# Patient Record
(demographics unavailable — no encounter records)

---

## 2019-02-05 NOTE — EMERGENCY DEPARTMENT REPORT
ED Abdominal Pain HPI





- General


Chief Complaint: Abdominal Pain


Stated Complaint: UPPER STOMACH PAIN


Time Seen by Provider: 19 02:26


Source: patient


Mode of arrival: Ambulatory


Limitations: No Limitations





- History of Present Illness


Initial Comments: 


Patient is a 57 -year-old female who presents for left flank pain radiating from

left upper quadrant for the past 5 months patient followed by GI followed by PCP

advised that she has costochondritis however now pain is exacerbated by voiding 

there is no fever no chills no hematuria patient does endorse frequency urgency 

there is no vaginal discharge or bladder incontinence is been no nausea vomiting

symptoms are relieved by narcotic pain medicines symptoms are exacerbated by 

voiding movement bending twisting. 





MD Complaint: abdominal pain, flank pain


Onset/Timin


-: week(s), unknown (x 5 months recurring )


Location: L flank


Radiation: LUQ


Migration to: no migration, L flank


Severity: moderate


Severity scale (0 -10): 5


Quality: aching, sharp


Consistency: constant


Improves With: nothing


Worsens With: other (voiding bending twisting )


Associated Symptoms: dysuria.  denies: nausea, vomiting, diarrhea, fever, 

chills, constipation, hematemesis, hematochezia, melena, hematuria, anorexia, 

syncope





- Related Data


LMP (females 10-50): other (s/p menopausal)


                                  Previous Rx's











 Medication  Instructions  Recorded  Last Taken  Type


 


Ciprofloxacin HCl [Cipro] 500 mg PO BID #20 tablet 19 Unknown Rx


 


Omeprazole 40 mg PO DAILY #30 capsule. 19 Unknown Rx


 


metroNIDAZOLE [Flagyl] 500 mg PO BID 10 Days #20 tab 19 Unknown Rx


 


traMADol [Ultram] 50 mg PO Q6HR PRN #12 tablet 19 Unknown Rx











                                    Allergies











Allergy/AdvReac Type Severity Reaction Status Date / Time


 


No Known Allergies Allergy   Verified 19 03:37














ED Review of Systems


ROS: 


Stated complaint: UPPER STOMACH PAIN


Other details as noted in HPI





Constitutional: denies: chills, fever


Eyes: denies: eye pain, eye discharge, vision change


ENT: denies: ear pain, throat pain


Respiratory: denies: cough, shortness of breath, wheezing


Cardiovascular: denies: chest pain, palpitations


Endocrine: no symptoms reported


Gastrointestinal: abdominal pain (LUQ ).  denies: nausea, vomiting, diarrhea, 

constipation, hematemesis, melena, hematochezia


Genitourinary: urgency, dysuria, frequency.  denies: hematuria, discharge, 

abnormal menses


Musculoskeletal: denies: back pain, joint swelling, arthralgia


Skin: denies: rash, lesions


Neurological: denies: headache, weakness, paresthesias


Psychiatric: denies: anxiety, depression


Hematological/Lymphatic: denies: easy bleeding, easy bruising





ED Past Medical Hx





- Past Medical History


Previous Medical History?: No





- Surgical History


Additional Surgical History: hysterectomy





- Social History


Smoking Status: Never Smoker


Substance Use Type: None





- Medications


Home Medications: 


                                Home Medications











 Medication  Instructions  Recorded  Confirmed  Last Taken  Type


 


Ciprofloxacin HCl [Cipro] 500 mg PO BID #20 tablet 19  Unknown Rx


 


Omeprazole 40 mg PO DAILY #30 capsule.dr 19  Unknown Rx


 


metroNIDAZOLE [Flagyl] 500 mg PO BID 10 Days #20 tab 19  Unknown Rx


 


traMADol [Ultram] 50 mg PO Q6HR PRN #12 tablet 19  Unknown Rx














ED Physical Exam





- General


Limitations: No Limitations


General appearance: alert, in no apparent distress





- Head


Head exam: Present: atraumatic, normocephalic





- Eye


Eye exam: Present: normal appearance, PERRL, EOMI





- ENT


ENT exam: Present: mucous membranes moist





- Neck


Neck exam: Present: normal inspection, full ROM.  Absent: tenderness, 

lymphadenopathy, thyromegaly





- Respiratory


Respiratory exam: Present: normal lung sounds bilaterally.  Absent: respiratory 

distress, wheezes, stridor, chest wall tenderness





- Cardiovascular


Cardiovascular Exam: Present: regular rate, normal rhythm, normal heart sounds. 

 Absent: systolic murmur, diastolic murmur, rubs, gallop





- GI/Abdominal


GI/Abdominal exam: Present: soft, tenderness (LUQ ), normal bowel sounds.  Abse

nt: distended, guarding, rebound, rigid, organomegaly, bruit, hernia





- Rectal


Rectal exam: Present: deferred





- 


External exam: Present: normal external exam, other (exam deferred )





- Extremities Exam


Extremities exam: Present: normal inspection





- Back Exam


Back exam: Present: normal inspection, full ROM.  Absent: tenderness, CVA 

tenderness (R), CVA tenderness (L), muscle spasm, rash noted





- Neurological Exam


Neurological exam: Present: alert, oriented X3, CN II-XII intact, normal gait





- Psychiatric


Psychiatric exam: Present: normal affect, normal mood





- Skin


Skin exam: Present: warm, dry, intact, normal color.  Absent: rash





ED Course


                                   Vital Signs











  19





  19:49 03:38


 


Temperature 97.9 F 


 


Pulse Rate 101 H 


 


Respiratory 18 20





Rate  


 


Blood Pressure 146/88 


 


O2 Sat by Pulse 99 





Oximetry  














ED Medical Decision Making





- Lab Data


Result diagrams: 


                                 19 20:01





                                 19 20:01








Labs











  19





  20:01 20:01 20:20


 


WBC  9.2  


 


RBC  4.68  


 


Hgb  13.5  


 


Hct  40.7  


 


MCV  87  


 


MCH  29  


 


MCHC  33  


 


RDW  13.4  


 


Plt Count  256  


 


Lymph % (Auto)  31.8  


 


Mono % (Auto)  5.9  


 


Eos % (Auto)  2.9  


 


Baso % (Auto)  0.5  


 


Lymph #  2.9  


 


Mono #  0.5  


 


Eos #  0.3  


 


Baso #  0.0  


 


Seg Neutrophils %  58.9  


 


Seg Neutrophils #  5.4  


 


Sodium   139 


 


Potassium   4.1 


 


Chloride   98.7 


 


Carbon Dioxide   29 


 


Anion Gap   15 


 


BUN   14 


 


Creatinine   0.8 


 


Estimated GFR   > 60 


 


BUN/Creatinine Ratio   18 


 


Glucose   191 H 


 


Calcium   9.3 


 


Total Bilirubin   0.60 


 


AST   11 


 


ALT   12 


 


Alkaline Phosphatase   72 


 


Total Protein   7.4 


 


Albumin   4.0 


 


Albumin/Globulin Ratio   1.2 


 


Lipase   65 H 


 


Urine Color    Straw


 


Urine Turbidity    Clear


 


Urine pH    6.0


 


Ur Specific Gravity    1.025


 


Urine Protein    <15 mg/dl


 


Urine Glucose (UA)    >=500


 


Urine Ketones    Neg


 


Urine Blood    Neg


 


Urine Nitrite    Neg


 


Urine Bilirubin    Neg


 


Urine Urobilinogen    < 2.0


 


Ur Leukocyte Esterase    Lg


 


Urine WBC (Auto)    121.0 H


 


Urine RBC (Auto)    8.0














- Radiology Data


Radiology results: report reviewed, image reviewed


FINAL REPORT 





PROCEDURE: CT ABDOMEN PELVIS WO CON 





TECHNIQUE: Computerized axial tomography of the abdomen and pelvis was performed

 without 


intravenous contrast. This study is performed without intravascular contrast mat

erial and its 


sensitivity for abdominal and pelvic pathology, including neoplasms, 

inflammation, abscess, free 


fluid, thrombosis, arterial dissection and infarction, is reduced compared with 

a contrast enhanced 


study. 





HISTORY: left flank pain 





COMPARISON: No prior studies are available for comparison. 





FINDINGS: 


Visualized lower thorax: No significant abnormality. 





Liver: Normal size and attenuation. 





Spleen: Normal size and attenuation. 





Gallbladder and biliary system: Normal. 





Pancreas: Normal. 





Adrenals: Normal. 





Kidneys: There are multiple cysts in the left kidney measuring up to 4 

centimeters. There are no 


kidney stones or ureteral stones. There is no hydronephrosis.. 





GI tract: There is no bowel obstruction, colitis or enteritis. There are 

diverticula of the sigmoid


and left colon. The appendix is normal.. 





Lymph nodes and mesentery: Normal. 





Vasculature: Normal. 





Bladder: Normal. 





Reproductive organs: There has been a hysterectomy. 





Peritoneum: There is no ascites or free air, abscess or adenopathy.. 





Musculoskeletal structures: No significant abnormality. 





Other: None. 





IMPRESSION: 








There are multiple cysts in the left kidney measuring up to 4 centimeters. There

 are no kidney 


stones or ureteral stones. There is no hydronephrosis.. 





There is no bowel obstruction, colitis or enteritis. There are diverticula of 

the sigmoid and left 


colon. The appendix is normal.. 





There has been a hysterectomy. 





There is no ascites or free air, abscess or adenopathy.. 





. 











Transcribed By: CO 


Dictated By: JEREMIE HORN MD 


Electronically Authenticated By: JEREMIE HORN MD 


Signed Date/Time: 19 











DD/DT: 19 


TD/TT: 19








- Medical Decision Making


 CT abd and pelvis: pos for renal cyst bilat, diverticula in sigmoid and left 

colon no diverticulitis , urine : pos for wbc, leuk, plan: rocepin 1gm ivpb, dc 

to home with cipro and flagyl, omeprazole, ultram prn pain pt will follow up 

with pcp in 2-3 days follow up with GI will call today to confirm apt with 

[facility] as she has been seen by them in past pain is now improved, pt is 

tolerating po intake without n/v no there is no fever or chills, pt for dc to 

home in stable condition at this time. 





Critical care attestation.: 


If time is entered above; I have spent that time in minutes in the direct care 

of this critically ill patient, excluding procedure time.








ED Disposition


Clinical Impression: 


 Renal cyst





UTI (urinary tract infection)


Qualifiers:


 Urinary tract infection type: acute cystitis Hematuria presence: without 

hematuria Qualified Code(s): N30.00 - Acute cystitis without hematuria





Abdominal pain


Qualifiers:


 Abdominal location: left upper quadrant Qualified Code(s): R10.12 - Left upper 

quadrant pain





Diverticulosis


Qualifiers:


 Diverticulosis site: diverticulosis of large intestine Diverticulosis bleeding:

 diverticulosis without bleeding Qualified Code(s): K57.30 - Diverticulosis of 

large intestine without perforation or abscess without bleeding





Disposition:  TO HOME OR SELFCARE


Is pt being admited?: No


Does the pt Need Aspirin: No (will likely only I will undergo I)


Condition: Stable


Instructions:  Abdominal Pain (ED), Diverticulosis Diet (ED), Diverticulosis 

(ED), Urinary Tract Infection in Women (ED), Flank Pain (ED)


Prescriptions: 


Ciprofloxacin HCl [Cipro] 500 mg PO BID #20 tablet


metroNIDAZOLE [Flagyl] 500 mg PO BID 10 Days #20 tab


Omeprazole 40 mg PO DAILY #30 capsule.


traMADol [Ultram] 50 mg PO Q6HR PRN #12 tablet


 PRN Reason: Pain


Referrals: 


RAMIRO RODRIGES MD [Staff Physician] - 3-5 Days


[facility] [Outside] - 3-5 Days


RONAN EASTMAN MD [Staff Physician] - 3-5 Days


Forms:  Work/School Release Form(ED)


Time of Disposition: 05:40

## 2019-02-05 NOTE — CAT SCAN REPORT
FINAL REPORT



PROCEDURE:  CT ABDOMEN PELVIS WO CON



TECHNIQUE:  Computerized axial tomography of the abdomen and pelvis was performed without intravenous
 contrast. This study is performed without intravascular contrast material and its sensitivity for ab
dominal and pelvic pathology, including neoplasms, inflammation, abscess, free fluid, thrombosis, art
erial dissection and infarction, is reduced compared with a contrast enhanced study. 



HISTORY:  left flank pain 



COMPARISON:  No prior studies are available for comparison.



FINDINGS:  

Visualized lower thorax: No significant abnormality.



Liver: Normal size and attenuation.



Spleen: Normal size and attenuation.



Gallbladder and biliary system: Normal.



Pancreas: Normal.



Adrenals: Normal.



Kidneys: There are multiple cysts in the left kidney measuring up to 4 centimeters. There are no kidn
ey stones or ureteral stones. There is no hydronephrosis..



GI tract: There is no bowel obstruction, colitis or enteritis. There are diverticula of the sigmoid a
nd left colon. The appendix is normal..



Lymph nodes and mesentery: Normal.



Vasculature: Normal.



Bladder: Normal.



Reproductive organs: There has been a hysterectomy.



Peritoneum: There is no ascites or free air, abscess or adenopathy..



Musculoskeletal structures: No significant abnormality.



Other: None.



IMPRESSION:  





There are multiple cysts in the left kidney measuring up to 4 centimeters. There are no kidney stones
 or ureteral stones. There is no hydronephrosis..



There is no bowel obstruction, colitis or enteritis. There are diverticula of the sigmoid and left co
renaldo. The appendix is normal..



There has been a hysterectomy.



There is no ascites or free air, abscess or adenopathy..



.